# Patient Record
Sex: FEMALE | NOT HISPANIC OR LATINO | URBAN - METROPOLITAN AREA
[De-identification: names, ages, dates, MRNs, and addresses within clinical notes are randomized per-mention and may not be internally consistent; named-entity substitution may affect disease eponyms.]

---

## 2018-01-24 ENCOUNTER — EMERGENCY (EMERGENCY)
Facility: HOSPITAL | Age: 68
LOS: 1 days | Discharge: HOME | End: 2018-01-24
Attending: EMERGENCY MEDICINE
Payer: COMMERCIAL

## 2018-01-24 VITALS
RESPIRATION RATE: 18 BRPM | OXYGEN SATURATION: 99 % | TEMPERATURE: 98 F | HEART RATE: 77 BPM | DIASTOLIC BLOOD PRESSURE: 71 MMHG | SYSTOLIC BLOOD PRESSURE: 118 MMHG

## 2018-01-24 VITALS
SYSTOLIC BLOOD PRESSURE: 111 MMHG | OXYGEN SATURATION: 99 % | DIASTOLIC BLOOD PRESSURE: 66 MMHG | TEMPERATURE: 98 F | HEART RATE: 83 BPM | RESPIRATION RATE: 18 BRPM

## 2018-01-24 PROCEDURE — 73130 X-RAY EXAM OF HAND: CPT

## 2018-01-24 PROCEDURE — 73110 X-RAY EXAM OF WRIST: CPT | Mod: 26,LT

## 2018-01-24 PROCEDURE — 90715 TDAP VACCINE 7 YRS/> IM: CPT

## 2018-01-24 PROCEDURE — 73110 X-RAY EXAM OF WRIST: CPT

## 2018-01-24 PROCEDURE — 73080 X-RAY EXAM OF ELBOW: CPT | Mod: 26,LT

## 2018-01-24 PROCEDURE — 73130 X-RAY EXAM OF HAND: CPT | Mod: 26,LT

## 2018-01-24 PROCEDURE — 73080 X-RAY EXAM OF ELBOW: CPT

## 2018-01-24 PROCEDURE — 26720 TREAT FINGER FRACTURE EACH: CPT | Mod: F3

## 2018-01-24 PROCEDURE — 99285 EMERGENCY DEPT VISIT HI MDM: CPT | Mod: 25

## 2018-01-24 PROCEDURE — 73562 X-RAY EXAM OF KNEE 3: CPT | Mod: 26,LT

## 2018-01-24 PROCEDURE — 73562 X-RAY EXAM OF KNEE 3: CPT

## 2018-01-24 PROCEDURE — 90471 IMMUNIZATION ADMIN: CPT

## 2018-01-24 PROCEDURE — 25600 CLTX DST RDL FX/EPHYS SEP WO: CPT | Mod: LT

## 2018-01-24 PROCEDURE — 99284 EMERGENCY DEPT VISIT MOD MDM: CPT

## 2018-01-24 RX ORDER — IBUPROFEN 200 MG
600 TABLET ORAL ONCE
Qty: 0 | Refills: 0 | Status: COMPLETED | OUTPATIENT
Start: 2018-01-24 | End: 2018-01-24

## 2018-01-24 RX ORDER — TETANUS TOXOID, REDUCED DIPHTHERIA TOXOID AND ACELLULAR PERTUSSIS VACCINE, ADSORBED 5; 2.5; 8; 8; 2.5 [IU]/.5ML; [IU]/.5ML; UG/.5ML; UG/.5ML; UG/.5ML
0.5 SUSPENSION INTRAMUSCULAR ONCE
Qty: 0 | Refills: 0 | Status: COMPLETED | OUTPATIENT
Start: 2018-01-24 | End: 2018-01-24

## 2018-01-24 RX ORDER — ACETAMINOPHEN 500 MG
975 TABLET ORAL ONCE
Qty: 0 | Refills: 0 | Status: COMPLETED | OUTPATIENT
Start: 2018-01-24 | End: 2018-01-24

## 2018-01-24 RX ADMIN — Medication 975 MILLIGRAM(S): at 20:24

## 2018-01-24 RX ADMIN — TETANUS TOXOID, REDUCED DIPHTHERIA TOXOID AND ACELLULAR PERTUSSIS VACCINE, ADSORBED 0.5 MILLILITER(S): 5; 2.5; 8; 8; 2.5 SUSPENSION INTRAMUSCULAR at 19:12

## 2018-01-24 NOTE — ED PROVIDER NOTE - PHYSICAL EXAMINATION
A primary and secondary survey was performed. Airway: oropharynx clear, Breathing: normal breath sounds bilaterally, pt phonating well, Circulation: Mentation normal, pulses palpated in all 4 limbs, no obvious active external hemorrhage, lungs/abd/pelvis/legs wo signs of blood accumulation. Disability: Neuro intact / pupils equal round reactive. Exposure: No external signs of trauma EXCEPT L wrist swelling. Spine including c-spine non-tender. No neck pain and ROM wnl. C/spine cleared by nexus criteria.    Gen: Well appearing not in distress. Head: NC,AT. No drew sign/raccoon eyes. ENT: No hemoTM, oropharynx as above, no nasal hemorrhage. Neck: as above. Chest: Lung exam- CTAB, no ttp in chest wall. Cardiac: RRR S1S2, No JVD. Abd: soft, non-tender. Normal in color. Pelvis: Stable. Ext: no ttp in all 4 limbs, rom at shoulder, elbow, hip and knee wnl, except L elbow decreased rom and ttp, L hand w swelling to distal mc ring and pinky, L knee w abrasion to lower knee 2 cm x 2 cm w point ttp. Skin: No Abrasions or lacerations. Neuro: GCS 15 , Moving 4 limbs, Speech fluid and clear, able to ambulate. Psych: Normal affect, judgment.

## 2018-01-24 NOTE — ED PROVIDER NOTE - OBJECTIVE STATEMENT
67 female otherwise well, tripped and fell, no head strike no loc, no neck pain, now w L knee pain, L elbow pain, L wrist pain.

## 2018-01-24 NOTE — ED PROVIDER NOTE - CARE PLAN
Principal Discharge DX:	Wrist fracture, closed, left, initial encounter  Goal:	knee sprain and knee abrasion, Left  Secondary Diagnosis:	Closed nondisplaced fracture of proximal phalanx of left middle finger, initial encounter

## 2018-01-26 NOTE — CONSULT NOTE ADULT - SUBJECTIVE AND OBJECTIVE BOX
HPI: Patient is a 67 female who presents to the ER c/o left hand, left wrist, left 4th finger pain and swelling. Patient states that she fell and injured her left wrist, left hand and left 4th finger.     PAST MEDICAL & SURGICAL HISTORY:  No pertinent past medical history      Review of systems: Non Contributory    MEDICATIONS  (STANDING):    Allergies: No known Allergies      Physical Examination:    Musculoskeletal:   Left wrist examination shows pain to palpation to distal radius area. There is swelling and ecchymosis noted. Range of motion is decreased.   Examination of left 4th finger shows pain to palpation of the level of the MP joint, swelling, ecchymosis, and decreased range of motion.     Neurovascularly Intact      RADIOLOGY & ADDITIONAL STUDIES: X-ray of left wrist shows evidence minimall displaced distal radius/styloid fracture.   X-ray of left 4th finger shows minimally displaced fracture at the base of the proximal phalanx.     ASSESSMENT: Left wrist fracture. Left 4th finger fracture.     PLAN/RECOMMENDATION: Closed treatment of both fractures and application of splint.     FOLLOW UP: With office in 3-4 days.

## 2018-03-02 PROBLEM — Z00.00 ENCOUNTER FOR PREVENTIVE HEALTH EXAMINATION: Status: ACTIVE | Noted: 2018-03-02

## 2018-03-09 ENCOUNTER — APPOINTMENT (OUTPATIENT)
Dept: ORTHOPEDIC SURGERY | Facility: CLINIC | Age: 68
End: 2018-03-09
Payer: MEDICARE

## 2018-03-09 VITALS
WEIGHT: 110 LBS | SYSTOLIC BLOOD PRESSURE: 131 MMHG | BODY MASS INDEX: 19.25 KG/M2 | HEIGHT: 63.5 IN | HEART RATE: 69 BPM | DIASTOLIC BLOOD PRESSURE: 87 MMHG

## 2018-03-09 DIAGNOSIS — S62.615A DISPLACED FRACTURE OF PROXIMAL PHALANX OF LEFT RING FINGER, INITIAL ENCOUNTER FOR CLOSED FRACTURE: ICD-10-CM

## 2018-03-09 DIAGNOSIS — M25.642 STIFFNESS OF LEFT HAND, NOT ELSEWHERE CLASSIFIED: ICD-10-CM

## 2018-03-09 DIAGNOSIS — Z82.61 FAMILY HISTORY OF ARTHRITIS: ICD-10-CM

## 2018-03-09 PROCEDURE — 99203 OFFICE O/P NEW LOW 30 MIN: CPT

## 2018-03-09 PROCEDURE — 73140 X-RAY EXAM OF FINGER(S): CPT | Mod: F3

## 2020-11-24 ENCOUNTER — OFFICE VISIT (OUTPATIENT)
Dept: URBAN - METROPOLITAN AREA CLINIC 27 | Facility: CLINIC | Age: 70
End: 2020-11-24

## 2020-11-24 PROBLEM — 275978004 SCREENING FOR MALIGNANT NEOPLASM OF COLON: Status: ACTIVE | Noted: 2020-11-24

## 2020-12-01 ENCOUNTER — OFFICE VISIT (OUTPATIENT)
Dept: URBAN - METROPOLITAN AREA SURGERY CENTER 7 | Facility: SURGERY CENTER | Age: 70
End: 2020-12-01

## 2022-04-30 ENCOUNTER — TELEPHONE ENCOUNTER (OUTPATIENT)
Dept: URBAN - METROPOLITAN AREA CLINIC 121 | Facility: CLINIC | Age: 72
End: 2022-04-30

## 2022-05-01 ENCOUNTER — TELEPHONE ENCOUNTER (OUTPATIENT)
Dept: URBAN - METROPOLITAN AREA CLINIC 121 | Facility: CLINIC | Age: 72
End: 2022-05-01

## 2022-05-01 RX ORDER — FAMOTIDINE 10 MG
TABLET ORAL
Status: ACTIVE | COMMUNITY
Start: 2020-12-01